# Patient Record
Sex: FEMALE | Race: WHITE | NOT HISPANIC OR LATINO | Employment: OTHER | URBAN - NONMETROPOLITAN AREA
[De-identification: names, ages, dates, MRNs, and addresses within clinical notes are randomized per-mention and may not be internally consistent; named-entity substitution may affect disease eponyms.]

---

## 2021-05-12 ENCOUNTER — OFFICE VISIT (OUTPATIENT)
Dept: OBSTETRICS AND GYNECOLOGY | Facility: CLINIC | Age: 86
End: 2021-05-12

## 2021-05-12 ENCOUNTER — LAB (OUTPATIENT)
Dept: LAB | Facility: HOSPITAL | Age: 86
End: 2021-05-12

## 2021-05-12 VITALS
HEIGHT: 67 IN | BODY MASS INDEX: 23.32 KG/M2 | WEIGHT: 148.6 LBS | SYSTOLIC BLOOD PRESSURE: 140 MMHG | DIASTOLIC BLOOD PRESSURE: 62 MMHG

## 2021-05-12 DIAGNOSIS — N83.201 RIGHT OVARIAN CYST: ICD-10-CM

## 2021-05-12 DIAGNOSIS — C56.1 MALIGNANT NEOPLASM OF RIGHT OVARY (HCC): ICD-10-CM

## 2021-05-12 DIAGNOSIS — N83.201 RIGHT OVARIAN CYST: Primary | ICD-10-CM

## 2021-05-12 LAB
ALBUMIN SERPL-MCNC: 3.7 G/DL (ref 3.5–5.2)
ALBUMIN/GLOB SERPL: 1.3 G/DL
ALP SERPL-CCNC: 88 U/L (ref 39–117)
ALT SERPL W P-5'-P-CCNC: 16 U/L (ref 1–33)
ANION GAP SERPL CALCULATED.3IONS-SCNC: 10.6 MMOL/L (ref 5–15)
AST SERPL-CCNC: 23 U/L (ref 1–32)
BILIRUB SERPL-MCNC: 0.5 MG/DL (ref 0–1.2)
BUN SERPL-MCNC: 16 MG/DL (ref 8–23)
BUN/CREAT SERPL: 20.3 (ref 7–25)
CALCIUM SPEC-SCNC: 9.6 MG/DL (ref 8.6–10.5)
CANCER AG125 SERPL QL: 13.2 U/ML (ref 0–38.1)
CHLORIDE SERPL-SCNC: 105 MMOL/L (ref 98–107)
CO2 SERPL-SCNC: 25.4 MMOL/L (ref 22–29)
CREAT SERPL-MCNC: 0.79 MG/DL (ref 0.57–1)
DEPRECATED RDW RBC AUTO: 39.1 FL (ref 37–54)
ERYTHROCYTE [DISTWIDTH] IN BLOOD BY AUTOMATED COUNT: 12.7 % (ref 12.3–15.4)
GFR SERPL CREATININE-BSD FRML MDRD: 70 ML/MIN/1.73
GLOBULIN UR ELPH-MCNC: 2.9 GM/DL
GLUCOSE SERPL-MCNC: 171 MG/DL (ref 65–99)
HCT VFR BLD AUTO: 34.9 % (ref 34–46.6)
HGB BLD-MCNC: 11.7 G/DL (ref 12–15.9)
MCH RBC QN AUTO: 28.7 PG (ref 26.6–33)
MCHC RBC AUTO-ENTMCNC: 33.5 G/DL (ref 31.5–35.7)
MCV RBC AUTO: 85.5 FL (ref 79–97)
PLATELET # BLD AUTO: 264 10*3/MM3 (ref 140–450)
PMV BLD AUTO: 11.3 FL (ref 6–12)
POTASSIUM SERPL-SCNC: 4.2 MMOL/L (ref 3.5–5.2)
PROT SERPL-MCNC: 6.6 G/DL (ref 6–8.5)
RBC # BLD AUTO: 4.08 10*6/MM3 (ref 3.77–5.28)
SODIUM SERPL-SCNC: 141 MMOL/L (ref 136–145)
WBC # BLD AUTO: 9.78 10*3/MM3 (ref 3.4–10.8)

## 2021-05-12 PROCEDURE — 99204 OFFICE O/P NEW MOD 45 MIN: CPT | Performed by: OBSTETRICS & GYNECOLOGY

## 2021-05-12 PROCEDURE — 36415 COLL VENOUS BLD VENIPUNCTURE: CPT

## 2021-05-12 PROCEDURE — 80053 COMPREHEN METABOLIC PANEL: CPT

## 2021-05-12 PROCEDURE — 85027 COMPLETE CBC AUTOMATED: CPT

## 2021-05-12 PROCEDURE — 86304 IMMUNOASSAY TUMOR CA 125: CPT

## 2021-05-12 RX ORDER — RAMIPRIL 2.5 MG/1
CAPSULE ORAL
COMMUNITY
Start: 2021-04-02

## 2021-05-12 RX ORDER — FERROUS SULFATE TAB EC 324 MG (65 MG FE EQUIVALENT) 324 (65 FE) MG
324 TABLET DELAYED RESPONSE ORAL
COMMUNITY

## 2021-05-12 RX ORDER — ATORVASTATIN CALCIUM 40 MG/1
40 TABLET, FILM COATED ORAL
COMMUNITY
Start: 2021-04-02

## 2021-05-12 RX ORDER — PAROXETINE 10 MG/1
10 TABLET, FILM COATED ORAL DAILY
COMMUNITY
Start: 2021-04-02

## 2021-05-12 RX ORDER — ACETAMINOPHEN 500 MG
1000 TABLET ORAL EVERY 6 HOURS PRN
COMMUNITY

## 2021-05-14 NOTE — PROGRESS NOTES
Yeimy Zhang is a 85 y.o. y/o female.     Chief Complaint: Cyst right ovary    HPI:   85 y.o. No obstetric history on file..  No LMP recorded..  Patient was having some abdominal pain worked up by primary care physician with CT scan showed a right ovarian cyst..  Pain is described as primary lower abdomen right more so than left made worse by movement          Review of Systems     Constitutional: Denies night sweats    HENT: No hearing changes, denies ear pain    Eye: No eye pain; no foreign body in eye    Pulmonary: No hemoptysis    Cardiovascular: No claudication    GI: No hematemesis    Musculoskeletal: No arthralgias, no joint swelling    Endocrine: No polydipsia or polyuria    Hematologic: Denies any free bleeding    Psychiatric: Denies any delusions    The following portions of the patient's history were reviewed and updated as appropriate: allergies, current medications, past family history, past medical history, past social history, past surgical history and problem list.    No Known Allergies     Outpatient Medications Prior to Visit   Medication Sig Dispense Refill   • acetaminophen (TYLENOL) 500 MG tablet Take 1,000 mg by mouth Every 6 (Six) Hours As Needed for Mild Pain .     • B Complex-C-E-Zn (b complex-C-E-zinc) tablet Take 1 tablet by mouth Daily.     • ferrous sulfate 324 (65 Fe) MG tablet delayed-release EC tablet Take 324 mg by mouth Daily With Breakfast.     • atorvastatin (LIPITOR) 40 MG tablet Take 40 mg by mouth every night at bedtime.     • metFORMIN (GLUCOPHAGE) 500 MG tablet TAKE 1/2 TABLET BY MOUTH EVERY MORNING, & 1/2 TABLET EVERY NIGHT AT BEDTIME     • PARoxetine (PAXIL) 10 MG tablet Take 10 mg by mouth Daily.     • ramipril (ALTACE) 2.5 MG capsule TAKE 1 CAPSULE BY MUOTH ONCE DAILY       No facility-administered medications prior to visit.        The patient has a family history of   No family history on file.     No past medical history on file.     OB History    No obstetric  "history on file.          Social History     Socioeconomic History   • Marital status:      Spouse name: Not on file   • Number of children: Not on file   • Years of education: Not on file   • Highest education level: Not on file   Tobacco Use   • Smoking status: Never Smoker   • Smokeless tobacco: Never Used   Substance and Sexual Activity   • Alcohol use: Never   • Drug use: Never   • Sexual activity: Not Currently        No past surgical history on file.     There is no problem list on file for this patient.       Documented Vitals    05/12/21 0829   BP: 140/62   Weight: 67.4 kg (148 lb 9.6 oz)   Height: 170.2 cm (67\")        Body mass index is 23.27 kg/m².    Physical Exam  Constitutional: Appears to be in no acute distress; Eyes: Sclerae normal; Endocrine system: Thyroid palpation is normal; Pulmonary system: Lungs clear; Cardiovascular system: Heart regular rate and rhythm; Gastrointestinal system: Abdomen soft and nontender, active bowel sounds; Urologic system: CVA negative; Psychiatric: Appropriate insight; Neurologic: Gait within normal limits     Laboratory Data:   Lab Results - Last 18 Months   Lab Units 05/12/21  0940   GLUCOSE mg/dL 171*   BUN mg/dL 16   CREATININE mg/dL 0.79   SODIUM mmol/L 141   POTASSIUM mmol/L 4.2   CHLORIDE mmol/L 105   CO2 mmol/L 25.4   CALCIUM mg/dL 9.6   TOTAL PROTEIN g/dL 6.6   ALBUMIN g/dL 3.70   ALT (SGPT) U/L 16   AST (SGOT) U/L 23   ALK PHOS U/L 88   BILIRUBIN mg/dL 0.5   EGFR IF NONAFRICN AM mL/min/1.73 70   GLOBULIN gm/dL 2.9   A/G RATIO g/dL 1.3   BUN / CREAT RATIO  20.3   ANION GAP mmol/L 10.6     Lab Results - Last 18 Months   Lab Units 05/12/21  0940   WBC 10*3/mm3 9.78   RBC 10*6/mm3 4.08   HEMOGLOBIN g/dL 11.7*   HEMATOCRIT % 34.9   MCV fL 85.5   MCH pg 28.7   MCHC g/dL 33.5   RDW % 12.7   RDW-SD fl 39.1   MPV fL 11.3   PLATELETS 10*3/mm3 264     No results for input(s): HCGQUAL in the last 37627 hours.  US Non-ob Transvaginal    Result Date: " 5/12/2021  There are two cysts adjacent to each other, the largest measuring 3.3 x 4.9 x 6.1 cm. Alternatively this may represent a single cyst with internal septation. Based on the size, appearance of the cyst and patient's age, surgical evaluation for this cyst is requested Electronically signed by:  Elton Norton MD  5/12/2021 1:39 PM CDT Workstation: [email or web address]  I reviewed the images with the patient and her daughter I think this probably is 1 cyst with septations and a complex daughter cyst within making it more of a complex cyst where can obtain laboratory work including CA-125 and go from there  Assessment        Diagnosis Plan   1. Right ovarian cyst  US Non-ob Transvaginal        Comprehensive Metabolic Panel    CBC (No Diff)   2. Malignant neoplasm of right ovary (CMS/HCC)       CBC (No Diff)         Plan       No orders of the defined types were placed in this encounter.            This document has been electronically signed by Ozzie Devine MD on May 14, 2021 13:33 CDT    Please note that portions of this note were completed with a voice recognition program.

## 2021-05-21 ENCOUNTER — TELEPHONE (OUTPATIENT)
Dept: OBSTETRICS AND GYNECOLOGY | Facility: CLINIC | Age: 86
End: 2021-05-21

## 2021-05-21 NOTE — TELEPHONE ENCOUNTER
I CALLED THE PATIENT BUT DIDN'T GET AN ANSWER SO I CALLED HER DAUGHTER ADÁN. I LET HER KNOW THAT MS. GUTIERREZ HAS AN APPOINTMENT WITH DR. FONG ON 06/03/2021 AT 10:30. I GAVE HER THE ADDRESS AND PHONE NUMBER ALSO. SHE ASKED IF SHE NEEDS TO COME  HER MOMS RECORDS. I LET HER KNOW THAT I FAXED THE RECORDS TO DR. FONG'S OFFICE. PATIENT VERBALIZED UNDERSTANDING.